# Patient Record
Sex: FEMALE | Race: WHITE | NOT HISPANIC OR LATINO | URBAN - METROPOLITAN AREA
[De-identification: names, ages, dates, MRNs, and addresses within clinical notes are randomized per-mention and may not be internally consistent; named-entity substitution may affect disease eponyms.]

---

## 2018-03-20 ENCOUNTER — IMPORTED ENCOUNTER (OUTPATIENT)
Dept: URBAN - METROPOLITAN AREA CLINIC 31 | Facility: CLINIC | Age: 48
End: 2018-03-20

## 2018-03-20 PROCEDURE — 92015 DETERMINE REFRACTIVE STATE: CPT

## 2018-03-20 PROCEDURE — 92004 COMPRE OPH EXAM NEW PT 1/>: CPT

## 2018-03-20 PROCEDURE — 92310 CONTACT LENS FITTING OU: CPT

## 2018-03-27 ENCOUNTER — IMPORTED ENCOUNTER (OUTPATIENT)
Dept: URBAN - METROPOLITAN AREA CLINIC 31 | Facility: CLINIC | Age: 48
End: 2018-03-27

## 2018-03-27 PROCEDURE — V2521 CNTCT LENS HYDROPHILIC TORIC: HCPCS

## 2018-03-27 NOTE — PATIENT DISCUSSION
Continue present contact lens modality. Stop/wear and call if any redness pain or decrease in vision occur.

## 2019-05-17 ENCOUNTER — IMPORTED ENCOUNTER (OUTPATIENT)
Dept: URBAN - METROPOLITAN AREA CLINIC 31 | Facility: CLINIC | Age: 49
End: 2019-05-17

## 2019-05-17 PROCEDURE — 92015 DETERMINE REFRACTIVE STATE: CPT

## 2019-05-17 PROCEDURE — 92310 CONTACT LENS FITTING OU: CPT

## 2019-05-17 PROCEDURE — 92014 COMPRE OPH EXAM EST PT 1/>: CPT

## 2020-01-20 ENCOUNTER — IMPORTED ENCOUNTER (OUTPATIENT)
Dept: URBAN - METROPOLITAN AREA CLINIC 31 | Facility: CLINIC | Age: 50
End: 2020-01-20

## 2020-01-20 PROCEDURE — 92310 CONTACT LENS FITTING OU: CPT

## 2020-01-20 PROCEDURE — 92014 COMPRE OPH EXAM EST PT 1/>: CPT

## 2020-01-20 NOTE — PATIENT DISCUSSION
1.  Dry Eye Syndrome OU:  Patient currently asymptomatic & declined treatment. Observe. 2.  1. Refractive error Annual Good ocular health documented. Discussed options of glasses contacts or refractive surgery. Discussed importance of annual eye exams. 2.   Dispense trial contacts OU. To discontinue and call if any problems.

## 2020-01-20 NOTE — PATIENT DISCUSSION
1.  Refractive error Annual Good ocular health documented. Discussed options of glasses contacts or refractive surgery. Discussed importance of annual eye exams. 2.   Dispense trial contacts OU. To discontinue and call if any problems.

## 2020-01-20 NOTE — PATIENT DISCUSSION
1.  Dry Eyes OU:  Start artificials tears. Encouraged regular use. 2.  1.  Dry Eye Syndrome OU:  Patient currently asymptomatic & declined treatment. Observe. Discussed CL solution vs ATS2.  1.  Refractive error Annual Good ocular health documented. Discussed options of glasses contacts or refractive surgery. Discussed importance of annual eye exams. 2.   Dispense trial contacts OU. To discontinue and call if any problems.

## 2020-01-21 PROBLEM — H04.123: Noted: 2020-01-21

## 2020-07-07 ENCOUNTER — TELEPHONE ENCOUNTER (OUTPATIENT)
Dept: URBAN - METROPOLITAN AREA CLINIC 9 | Facility: CLINIC | Age: 50
End: 2020-07-07

## 2020-07-10 ENCOUNTER — OFFICE VISIT (OUTPATIENT)
Dept: URBAN - METROPOLITAN AREA CLINIC 7 | Facility: CLINIC | Age: 50
End: 2020-07-10

## 2020-08-03 ENCOUNTER — TELEPHONE ENCOUNTER (OUTPATIENT)
Dept: URBAN - METROPOLITAN AREA CLINIC 9 | Facility: CLINIC | Age: 50
End: 2020-08-03

## 2020-08-06 ENCOUNTER — OFFICE VISIT (OUTPATIENT)
Dept: URBAN - METROPOLITAN AREA SURGERY CENTER 5 | Facility: SURGERY CENTER | Age: 50
End: 2020-08-06

## 2020-08-13 ENCOUNTER — OFFICE VISIT (OUTPATIENT)
Dept: URBAN - METROPOLITAN AREA SURGERY CENTER 5 | Facility: SURGERY CENTER | Age: 50
End: 2020-08-13

## 2020-08-18 ENCOUNTER — OFFICE VISIT (OUTPATIENT)
Dept: URBAN - METROPOLITAN AREA SURGERY CENTER 5 | Facility: SURGERY CENTER | Age: 50
End: 2020-08-18

## 2020-08-24 ENCOUNTER — OFFICE VISIT (OUTPATIENT)
Dept: URBAN - METROPOLITAN AREA SURGERY CENTER 5 | Facility: SURGERY CENTER | Age: 50
End: 2020-08-24

## 2021-03-15 ENCOUNTER — IMPORTED ENCOUNTER (OUTPATIENT)
Dept: URBAN - METROPOLITAN AREA CLINIC 31 | Facility: CLINIC | Age: 51
End: 2021-03-15

## 2021-03-15 PROCEDURE — 92014 COMPRE OPH EXAM EST PT 1/>: CPT

## 2021-04-05 ENCOUNTER — TELEPHONE ENCOUNTER (OUTPATIENT)
Dept: URBAN - METROPOLITAN AREA CLINIC 9 | Facility: CLINIC | Age: 51
End: 2021-04-05

## 2021-04-08 ENCOUNTER — OFFICE VISIT (OUTPATIENT)
Age: 51
End: 2021-04-08

## 2021-06-16 ENCOUNTER — OFFICE VISIT (OUTPATIENT)
Dept: URBAN - METROPOLITAN AREA CLINIC 7 | Facility: CLINIC | Age: 51
End: 2021-06-16

## 2021-08-23 ENCOUNTER — TELEPHONE ENCOUNTER (OUTPATIENT)
Dept: URBAN - METROPOLITAN AREA CLINIC 9 | Facility: CLINIC | Age: 51
End: 2021-08-23

## 2021-10-12 ENCOUNTER — TELEPHONE ENCOUNTER (OUTPATIENT)
Dept: URBAN - METROPOLITAN AREA CLINIC 9 | Facility: CLINIC | Age: 51
End: 2021-10-12

## 2021-10-18 ENCOUNTER — TELEPHONE ENCOUNTER (OUTPATIENT)
Dept: URBAN - METROPOLITAN AREA CLINIC 9 | Facility: CLINIC | Age: 51
End: 2021-10-18

## 2022-04-02 ASSESSMENT — VISUAL ACUITY
OD_CC: 20/40
OD_PH: SC 20/25
OD_SC: J322''
OS_CC: 20/30
OS_SC: J1
OS_CC: 20/30
OD_CC: 20/30
OS_CC: 20/20-2
OS_CC: 20/30
OS_SC: J314''
OS_SC: J322''
OD_SC: 20/25+2
OD_CC: 20/20-1
OD_SC: J314''
OD_CC: 20/40

## 2022-04-02 ASSESSMENT — TONOMETRY
OD_IOP_MMHG: 14
OS_IOP_MMHG: 14
OD_IOP_MMHG: 13
OS_IOP_MMHG: 13
OD_IOP_MMHG: 15
OS_IOP_MMHG: 14
OS_IOP_MMHG: 14
OD_IOP_MMHG: 15

## 2022-05-26 NOTE — PATIENT DISCUSSION
Instructed to call immediately if any new distortion, blurring, decreased vision or eye pain. Pt reports feeling 'a little the same'         Merry Boast, VALDEMAR  05/26/22 [id number]

## 2022-06-09 ENCOUNTER — TELEPHONE ENCOUNTER (OUTPATIENT)
Dept: URBAN - METROPOLITAN AREA CLINIC 9 | Facility: CLINIC | Age: 52
End: 2022-06-09

## 2022-07-30 ENCOUNTER — TELEPHONE ENCOUNTER (OUTPATIENT)
Age: 52
End: 2022-07-30

## 2022-07-30 RX ORDER — TRAMADOL HYDROCHLORIDE 50 MG/1
1 (ONE) TABLET ORAL
Qty: 0 | Refills: 2 | OUTPATIENT
Start: 2020-07-10 | End: 2020-07-25

## 2022-07-31 ENCOUNTER — TELEPHONE ENCOUNTER (OUTPATIENT)
Age: 52
End: 2022-07-31

## 2022-07-31 RX ORDER — PANTOPRAZOLE 20 MG/1
1 (ONE) TABLET, DELAYED RELEASE ORAL DAILY
Qty: 0 | Refills: 11 | Status: ACTIVE | COMMUNITY
Start: 2021-08-23

## 2022-07-31 RX ORDER — FOLIC ACID 1 MG/1
1 (ONE) TABLET ORAL DAILY
Qty: 0 | Refills: 16 | Status: ACTIVE | COMMUNITY
Start: 2021-04-06

## 2022-07-31 RX ORDER — TRAMADOL HYDROCHLORIDE 50 MG/1
1 (ONE) TABLET ORAL
Qty: 0 | Refills: 2 | Status: ACTIVE | COMMUNITY
Start: 2020-07-10

## 2022-07-31 RX ORDER — FOLIC ACID 1 MG/1
1 (ONE) TABLET ORAL DAILY
Qty: 0 | Refills: 16 | Status: ACTIVE | COMMUNITY
Start: 2020-07-10

## 2022-07-31 RX ORDER — PANTOPRAZOLE 20 MG/1
1 (ONE) TABLET, DELAYED RELEASE ORAL DAILY
Qty: 0 | Refills: 11 | Status: ACTIVE | COMMUNITY
Start: 2020-08-06

## 2022-07-31 RX ORDER — PANTOPRAZOLE 20 MG/1
1 (ONE) TABLET, DELAYED RELEASE ORAL DAILY
Qty: 0 | Refills: 11 | Status: ACTIVE | COMMUNITY
Start: 2021-08-24

## 2025-04-18 NOTE — PATIENT DISCUSSION
Instructed to call immediately if any new distortion, blurring, decreased vision or eye pain. Patient's blood pressure range in the last 24 hours was: BP  Min: 116/57  Max: 175/74.The patient's inpatient anti-hypertensive regimen is listed below:  Current Antihypertensives  , Every morning, Both Eyes  valsartan tablet 320 mg, Daily, Oral  amLODIPine tablet 5 mg, Daily, Oral    Plan  - BP is uncontrolled, will adjust as follows:  Resume home dose of antihypertensive agents.  - monitor blood pressure closely.

## (undated) LAB
ALBUMIN SERUM: (no result)
ALKALINE PHOSPHATASE: (no result)
ALPHA-FETOPROTEIN-SERUM: (no result)
ALT (SGPT) (ALANINE AMINO TRANSFERASE): (no result)
AST (SGOT)  (ASPART AMINO TRANSFERASE): (no result)
BASOPHILS: (no result)
BILIRUBIN, TOTAL: (no result)
BLOOD COUNT, PLATELET, AUTOMATED: (no result)
BUN (BLOOD UREA NITROGEN): (no result)
CALCIUM SERUM: (no result)
CBC COMMENTS: (no result)
CHLORIDE BLOOD: (no result)
CREATININE BLOOD: (no result)
EOSINOPHILS: (no result)
FERRITIN: (no result)
GLUCOSE: (no result)
HCT (HEMATOCRIT): (no result)
HEPATITIS A ANTBDY-IGG/IGM: (no result)
HEPATITIS B CORE ANTBD-IGG/IGM: (no result)
HEPATITIS B SURFACE ANTIBODY: (no result)
HEPATITIS B SURFACE ANTIGEN: (no result)
HEPATITIS BE ANTIBODY: (no result)
HEPATITIS BE ANTIGEN: (no result)
HEPATITIS C ANTIBODY: (no result)
HGB (HEMOGLOBIN): (no result)
INR: (no result)
IRON BINDING CAPACITY (TIBC): (no result)
IRON: (no result)
LIPASE: (no result)
LYMPHOCYTES, TOTAL: (no result)
Lab: (no result)
MCH (MEAN CORPUSCULAR HEMOGLOBIN): (no result)
MCHC (MEAN CORPUSCULAR HEMOGLOBIN CONCENTRATI: (no result)
MCV (MEAN CORPUSCULAR VOLUME): (no result)
MONOCYTES: (no result)
NEUTROPHILS, BAND: (no result)
NEUTROPHILS, SEGMENTED: (no result)
POTASSIUM SERUM: (no result)
PROTEIN, TOTAL, SERUM: (no result)
PT (PROTHROMBIN TIME): (no result)
PT RATIO: (no result)
RBC: (no result)
RDW (RED CELL DISTRIBUTION WIDTH): (no result)
SODIUM SERUM: (no result)
WBC: (no result)